# Patient Record
Sex: FEMALE | Race: WHITE | Employment: OTHER | URBAN - NONMETROPOLITAN AREA
[De-identification: names, ages, dates, MRNs, and addresses within clinical notes are randomized per-mention and may not be internally consistent; named-entity substitution may affect disease eponyms.]

---

## 2017-05-02 ENCOUNTER — OFFICE VISIT (OUTPATIENT)
Dept: CARDIOLOGY | Age: 77
End: 2017-05-02
Payer: MEDICARE

## 2017-05-02 VITALS
BODY MASS INDEX: 23.05 KG/M2 | DIASTOLIC BLOOD PRESSURE: 80 MMHG | HEART RATE: 68 BPM | HEIGHT: 64 IN | WEIGHT: 135 LBS | SYSTOLIC BLOOD PRESSURE: 160 MMHG

## 2017-05-02 DIAGNOSIS — I10 ESSENTIAL HYPERTENSION: Primary | ICD-10-CM

## 2017-05-02 DIAGNOSIS — E78.2 MIXED HYPERLIPIDEMIA: ICD-10-CM

## 2017-05-02 PROCEDURE — 99214 OFFICE O/P EST MOD 30 MIN: CPT | Performed by: CLINICAL NURSE SPECIALIST

## 2017-05-02 PROCEDURE — 93000 ELECTROCARDIOGRAM COMPLETE: CPT | Performed by: CLINICAL NURSE SPECIALIST

## 2017-05-02 RX ORDER — LOSARTAN POTASSIUM 50 MG/1
50 TABLET ORAL DAILY
Qty: 60 TABLET | Refills: 5 | Status: SHIPPED | OUTPATIENT
Start: 2017-05-02 | End: 2017-05-04 | Stop reason: DRUGHIGH

## 2017-05-02 RX ORDER — PRAVASTATIN SODIUM 20 MG
20 TABLET ORAL DAILY
COMMUNITY
Start: 2017-02-20

## 2017-05-02 RX ORDER — LOSARTAN POTASSIUM 50 MG/1
50 TABLET ORAL DAILY
COMMUNITY
Start: 2017-04-25 | End: 2017-05-02 | Stop reason: DRUGHIGH

## 2017-05-04 RX ORDER — LOSARTAN POTASSIUM 50 MG/1
50 TABLET ORAL 2 TIMES DAILY
Qty: 60 TABLET | Refills: 5 | Status: SHIPPED
Start: 2017-05-04

## 2017-05-04 RX ORDER — CLONIDINE HYDROCHLORIDE 0.1 MG/1
0.1 TABLET ORAL EVERY 6 HOURS PRN
Qty: 30 TABLET | Refills: 3 | Status: SHIPPED | OUTPATIENT
Start: 2017-05-04

## 2017-05-04 ASSESSMENT — ENCOUNTER SYMPTOMS
VOMITING: 0
BLURRED VISION: 0
COUGH: 0
ABDOMINAL PAIN: 0
HEARTBURN: 0
NAUSEA: 0
ORTHOPNEA: 0

## 2017-06-12 ENCOUNTER — TELEPHONE (OUTPATIENT)
Dept: CARDIOLOGY | Age: 77
End: 2017-06-12

## 2017-06-12 ENCOUNTER — OFFICE VISIT (OUTPATIENT)
Dept: CARDIOLOGY | Age: 77
End: 2017-06-12
Payer: MEDICARE

## 2017-06-12 DIAGNOSIS — I10 ESSENTIAL HYPERTENSION: ICD-10-CM

## 2017-06-12 DIAGNOSIS — R68.89 DECREASED EXERCISE TOLERANCE: Primary | ICD-10-CM

## 2017-06-12 DIAGNOSIS — R53.83 OTHER FATIGUE: ICD-10-CM

## 2017-06-12 DIAGNOSIS — E78.2 MIXED HYPERLIPIDEMIA: ICD-10-CM

## 2017-06-12 PROCEDURE — 99213 OFFICE O/P EST LOW 20 MIN: CPT | Performed by: CLINICAL NURSE SPECIALIST

## 2017-06-12 RX ORDER — CARVEDILOL 6.25 MG/1
TABLET ORAL
COMMUNITY
Start: 2017-05-22

## 2017-06-13 VITALS
SYSTOLIC BLOOD PRESSURE: 170 MMHG | HEIGHT: 63 IN | BODY MASS INDEX: 23.79 KG/M2 | HEART RATE: 60 BPM | WEIGHT: 134.25 LBS | DIASTOLIC BLOOD PRESSURE: 80 MMHG

## 2017-06-13 ASSESSMENT — ENCOUNTER SYMPTOMS
COUGH: 0
ABDOMINAL PAIN: 0
VOMITING: 0
ORTHOPNEA: 0
HEARTBURN: 0
BLURRED VISION: 0
NAUSEA: 0

## 2017-06-19 ENCOUNTER — HOSPITAL ENCOUNTER (OUTPATIENT)
Dept: NON INVASIVE DIAGNOSTICS | Age: 77
Discharge: HOME | End: 2017-06-19
Payer: MEDICARE

## 2017-06-19 DIAGNOSIS — R53.83 OTHER FATIGUE: ICD-10-CM

## 2017-06-19 DIAGNOSIS — R68.89 DECREASED EXERCISE TOLERANCE: ICD-10-CM

## 2017-06-19 LAB
LV EF: 50 %
LVEF MODALITY: NORMAL

## 2017-06-19 PROCEDURE — 93350 STRESS TTE ONLY: CPT

## 2017-09-19 ENCOUNTER — APPOINTMENT (OUTPATIENT)
Dept: LAB | Facility: HOSPITAL | Age: 77
End: 2017-09-19

## 2017-09-19 ENCOUNTER — OFFICE VISIT (OUTPATIENT)
Dept: ENDOCRINOLOGY | Facility: CLINIC | Age: 77
End: 2017-09-19

## 2017-09-19 VITALS
DIASTOLIC BLOOD PRESSURE: 60 MMHG | WEIGHT: 134 LBS | HEART RATE: 73 BPM | HEIGHT: 63 IN | BODY MASS INDEX: 23.74 KG/M2 | SYSTOLIC BLOOD PRESSURE: 142 MMHG

## 2017-09-19 DIAGNOSIS — E03.9 HYPOTHYROIDISM, UNSPECIFIED TYPE: Primary | ICD-10-CM

## 2017-09-19 DIAGNOSIS — E04.1 SOLITARY THYROID NODULE: ICD-10-CM

## 2017-09-19 PROBLEM — E04.0 SIMPLE GOITER: Status: ACTIVE | Noted: 2017-09-19

## 2017-09-19 LAB
T4 FREE SERPL-MCNC: 1.69 NG/DL (ref 0.78–2.19)
TSH SERPL DL<=0.05 MIU/L-ACNC: 1.04 MIU/ML (ref 0.46–4.68)

## 2017-09-19 PROCEDURE — 36415 COLL VENOUS BLD VENIPUNCTURE: CPT | Performed by: NURSE PRACTITIONER

## 2017-09-19 PROCEDURE — 84439 ASSAY OF FREE THYROXINE: CPT | Performed by: NURSE PRACTITIONER

## 2017-09-19 PROCEDURE — 99204 OFFICE O/P NEW MOD 45 MIN: CPT | Performed by: NURSE PRACTITIONER

## 2017-09-19 PROCEDURE — 84443 ASSAY THYROID STIM HORMONE: CPT | Performed by: NURSE PRACTITIONER

## 2017-09-19 PROCEDURE — 86376 MICROSOMAL ANTIBODY EACH: CPT | Performed by: NURSE PRACTITIONER

## 2017-09-19 RX ORDER — PRAVASTATIN SODIUM 20 MG
TABLET ORAL
COMMUNITY
Start: 2017-02-20

## 2017-09-19 RX ORDER — LEVOTHYROXINE SODIUM 0.07 MG/1
TABLET ORAL
COMMUNITY

## 2017-09-19 RX ORDER — LOSARTAN POTASSIUM 50 MG/1
TABLET ORAL
COMMUNITY
Start: 2017-05-04

## 2017-09-19 RX ORDER — AMOXICILLIN 250 MG
CAPSULE ORAL
COMMUNITY

## 2017-09-19 RX ORDER — CLONIDINE HYDROCHLORIDE 0.1 MG/1
TABLET ORAL
COMMUNITY
Start: 2017-05-04

## 2017-09-19 RX ORDER — CHLORAL HYDRATE 500 MG
CAPSULE ORAL
COMMUNITY

## 2017-09-19 RX ORDER — CARVEDILOL 6.25 MG/1
TABLET ORAL
COMMUNITY
Start: 2017-05-22

## 2017-09-19 NOTE — PROGRESS NOTES
Referring provider Mary Jo SIMPSON    History     77 year old female presents for consultation    Reason - Goiter      Duration  - since Feb. 2017    Timing - constant    Quality - not controlled    Severity - mild    Context - thyroid felt enlarged on exam    Location/size -     Feb. 21, 2017    Right thyroid lobe measure 3.9 x 1.2 x 1.9 cm     Left lobe measures 3.9 x 1.2 x 1.1 cm     Thyroid parenchyma is slightly heterogeneous    There is a 1.2 x 0.8 x 1.2 cm slightly echogenic solid nodule that is oval shaped wider than tall     There is a small well circumscribed hypoechoic nodule in the upper pole of the left lobe measuring 0.5 x 0.4 x 0.5 cm     Quantity -     Feb. 2017    TSH - 2.25  Free T4 - 1.90   ( she took the levothyroxine before the blood work so this is expected)    Symptoms - fatigue, elevated blood pressure     Alleviating Factors - levothyroxine     Aggravating Factors - none      Past Medical History:   Diagnosis Date   • Hypertension      History reviewed. No pertinent surgical history.  Family History   Problem Relation Age of Onset   • Hypertension Other    • Thyroid disease Other      Social History   Substance Use Topics   • Smoking status: Never Smoker   • Smokeless tobacco: Never Used   • Alcohol use No           Current Outpatient Prescriptions:   •  aspirin 81 MG tablet, Take  by mouth., Disp: , Rfl:   •  Calcium Carbonate-Vitamin D 600-400 MG-UNIT chewable tablet, Chew., Disp: , Rfl:   •  carvedilol (COREG) 6.25 MG tablet, , Disp: , Rfl:   •  CloNIDine (CATAPRES) 0.1 MG tablet, Take  by mouth., Disp: , Rfl:   •  levothyroxine (SYNTHROID, LEVOTHROID) 75 MCG tablet, Take  by mouth., Disp: , Rfl:   •  losartan (COZAAR) 50 MG tablet, Take  by mouth., Disp: , Rfl:   •  Omega-3 Fatty Acids (FISH OIL) 1000 MG capsule capsule, Take  by mouth., Disp: , Rfl:   •  pravastatin (PRAVACHOL) 20 MG tablet, Take  by mouth., Disp: , Rfl:   •  senna-docusate (PERICOLACE) 8.6-50 MG per tablet, Take   "by mouth., Disp: , Rfl:         Review of Systems   Constitutional: Negative for activity change, appetite change, diaphoresis and fatigue.   HENT: Negative for congestion, dental problem, drooling, facial swelling, sneezing, sore throat, tinnitus, trouble swallowing and voice change.    Eyes: Negative for photophobia, pain, discharge, redness, itching and visual disturbance.   Respiratory: Negative for apnea, cough, choking, chest tightness and shortness of breath.    Cardiovascular: Negative for chest pain, palpitations and leg swelling.   Gastrointestinal: Negative for abdominal distention, abdominal pain, constipation, diarrhea, nausea and vomiting.   Endocrine: Negative for cold intolerance, heat intolerance, polydipsia, polyphagia and polyuria.   Genitourinary: Negative for difficulty urinating, dysuria, frequency, hematuria and urgency.   Musculoskeletal: Negative for arthralgias, back pain, gait problem, joint swelling, myalgias, neck pain and neck stiffness.   Skin: Negative for color change, pallor, rash and wound.   Allergic/Immunologic: Negative for environmental allergies, food allergies and immunocompromised state.   Neurological: Negative for dizziness, tremors, facial asymmetry, weakness, light-headedness, numbness and headaches.   Hematological: Negative for adenopathy. Does not bruise/bleed easily.   Psychiatric/Behavioral: Negative for behavioral problems, confusion and sleep disturbance.       /60 (BP Location: Left arm, Patient Position: Sitting, Cuff Size: Adult)  Pulse 73  Ht 63\" (160 cm)  Wt 134 lb (60.8 kg)  BMI 23.74 kg/m2    Physical Exam   Constitutional: She is oriented to person, place, and time. She appears well-developed and well-nourished. No distress.   HENT:   Head: Normocephalic and atraumatic.   Right Ear: External ear normal.   Left Ear: External ear normal.   Nose: Nose normal.   Eyes: Conjunctivae and EOM are normal. Pupils are equal, round, and reactive to light. "   Neck: Normal range of motion. Neck supple. No tracheal deviation present. No thyromegaly present.   Cardiovascular: Normal rate, regular rhythm and normal heart sounds.    No murmur heard.  Pulmonary/Chest: Effort normal and breath sounds normal. No respiratory distress. She has no wheezes.   Abdominal: Soft. Bowel sounds are normal. There is no tenderness. There is no rebound and no guarding.   Musculoskeletal: Normal range of motion. She exhibits no edema, tenderness or deformity.   Neurological: She is alert and oriented to person, place, and time. No cranial nerve deficit.   Skin: Skin is warm and dry. No rash noted.   Psychiatric: She has a normal mood and affect. Her behavior is normal. Judgment and thought content normal.           Labs    No results found for: GLUCOSE, BUN, CREATININE, EGFRIFNONA, EGFRIFAFRI, BCR, K, CO2, CALCIUM, PROTENTOTREF, ALBUMIN, LABIL2, AST, ALT    No results found for: WBC, HGB, HCT, MCV, PLT    No results found for: ITPG32XH    No results found for: HGBA1C          Lab Results   Component Value Date    TSH 1.040 09/19/2017       Lab Results   Component Value Date    FREET4 1.69 09/19/2017       No results found for: C9LWQPA    Thyroid Peroxidase Antibodies    No results found for: THYROIDAB    ThyroidStimulating Immunoglobulin    No results found for: LABTHYR      Assessment         ICD-10-CM ICD-9-CM   1. Hypothyroidism, unspecified type E03.9 244.9   2. Solitary thyroid nodule E04.1 241.0       Nodule  Hypothyroidism     In summary Mrs. Mcnamara is a pleasant 77 year old female with a long standing history of hypothryodism and recently found to have thyroid nodules on an ultrasound. She has been referred to us for further evaluation.       PLAN:    thryoid report was reviewed and based on current YI guidelines the nodules do not meet biopsy criteria at this time.     The largest nodule measures 1.2 cm and recommendations nodule should be 1.5 cm or larger   We do not have the  disc to look at the nodules    Thyroid gland is not enlarged     We will repeat a thyroid ultrasound and ask patient to bring the disc to our office for personal review    We did discuss that we could do a FNA biopsy since it does measure 1.2 but she desires to repeat ultrasound for comparison and if growth will biopsy    Hypothyroidism    She is currently on levothyroxine 75 mcg daily     She has been controlled    She is complaining of fatigue so we will repeat a TSH today and if needed adjust medications       We will follow up in office after the ultrasound       Records from Mrs Mosher received and reviewed from 2017   Thank you for this consultation.            Records received and summarized from 2017  Labs reviewed from 2017 and ordered today   Thyroid imaging reviewed from 2017 and ordered today          Thyroid imaging reviewed from Feb. And Sept 2017    Reviewed by  as well    Bilateral thyroid nodules   Right measures 1.1 cm , left measures 0.9 cm - these areas may not be nodules may represent areas of heterogeneity and consistent with Hashimoto's  No FNA  Repeat u/s in one year

## 2017-09-20 ENCOUNTER — TELEPHONE (OUTPATIENT)
Dept: ENDOCRINOLOGY | Facility: CLINIC | Age: 77
End: 2017-09-20

## 2017-09-20 NOTE — TELEPHONE ENCOUNTER
----- Message from TATIANA Hoffman sent at 9/19/2017  6:07 PM CDT -----  Call patient with result-please let her know thyroid levels are normal so no change in dosage

## 2017-09-21 LAB — THYROPEROXIDASE AB SERPL-ACNC: 138 IU/ML (ref 0–34)

## 2017-10-04 ENCOUNTER — TELEPHONE (OUTPATIENT)
Dept: ENDOCRINOLOGY | Facility: CLINIC | Age: 77
End: 2017-10-04

## 2017-10-04 NOTE — TELEPHONE ENCOUNTER
----- Message from TATIANA Hoffman sent at 10/3/2017  1:31 PM CDT -----  Please let her know we looked at the ultrasound from feb and Sept; the exam was stable - nodules were actually smaller ;  does not feel that these are nodules states it appears consistent with hypothyroidism due to Hashimoto's; he wants to repeat a thyroid u/s in one year